# Patient Record
Sex: FEMALE | Race: AMERICAN INDIAN OR ALASKA NATIVE | NOT HISPANIC OR LATINO | Employment: OTHER | URBAN - METROPOLITAN AREA
[De-identification: names, ages, dates, MRNs, and addresses within clinical notes are randomized per-mention and may not be internally consistent; named-entity substitution may affect disease eponyms.]

---

## 2017-03-27 PROBLEM — R10.30 LOWER ABDOMINAL PAIN: Status: ACTIVE | Noted: 2017-03-27

## 2017-05-17 PROBLEM — K21.9 GERD (GASTROESOPHAGEAL REFLUX DISEASE): Status: ACTIVE | Noted: 2017-05-17

## 2017-05-17 PROBLEM — R10.30 LOWER ABDOMINAL PAIN: Status: RESOLVED | Noted: 2017-03-27 | Resolved: 2017-05-17

## 2017-05-17 PROBLEM — Z90.710 S/P TAH (TOTAL ABDOMINAL HYSTERECTOMY): Status: ACTIVE | Noted: 2017-05-17

## 2017-05-17 PROBLEM — E78.5 HLD (HYPERLIPIDEMIA): Status: ACTIVE | Noted: 2017-05-17

## 2017-05-22 ENCOUNTER — PATIENT OUTREACH (OUTPATIENT)
Dept: ADMINISTRATIVE | Facility: CLINIC | Age: 37
End: 2017-05-22

## 2017-05-22 NOTE — PATIENT INSTRUCTIONS
Exploratory Laparotomy     This is an example of the kind of incision the surgeon may use to perform exploratory laparotomy.   Exploratory laparotomy is surgery to open up the belly area (abdomen). This surgery is done to find the cause of problems (such as belly pain or bleeding) that testing could not diagnose. It is also used when an abdominal injury needs emergency medical care. This surgery uses one large cut (incision). The provider can then see and check the organs inside the abdomen. If the cause of the problem is found during the procedure, then treatment is often done at the same time. In some cases, a minimally invasive surgery called exploratory laparoscopy may be used instead. That method uses a tiny camera and several small incisions. But in many cases an exploratory laparotomy is preferred. Read on to learn more about this procedure.  Reasons for the surgery  Organs that may be looked at during exploratory laparotomy include:  · Liver  · Gallbladder  · Spleen  · Pancreas  · Kidneys  · Stomach  · Small intestine (small bowel)  · Large intestine (colon or large bowel)  · Appendix  · Ovaries, fallopian tubes, and uterus (in women)  · Lymph nodes   · Abdominal blood vessels   · Membranes that line the abdominal cavity  Getting ready for the surgery  The surgery takes place in a hospital. It is done by a surgeon. You will likely stay in the hospital for a few days or longer. To prepare for the surgery, do the following:  · Tell your provider about any medicines youre taking. This includes over-the-counter medicines, prescription medicines, herbs, street drugs, herbs, vitamins, and other supplements. You may need to stop taking some or all of them for a time before the surgery.  · Tell your provider if you drink alcohol. This is very important if you are a heavy drinker. Alcohol withdrawal can be life-threatening, so be honest with your provider.  · Also tell your provider if you have any allergies or  other health problems. This includes recent illnesses, especially any bleeding problems.   · You will need to stop eating and drinking for a set amount of time before the surgery. Be sure to follow these instructions carefully.  The day of the surgery  · Most exploratory laparotomies are done as emergency surgery after an injury or accident.   · You will be checked for risks of heart, lung, or other problems during surgery.   · You may need to change into a hospital gown.  · Before the surgery begins, an IV (intravenous) line is put into a vein in your arm or hand. This line supplies fluids and medicines.  · You will be given medicine (general anesthesia) to keep you free of pain. This medicine puts you in a state like deep sleep during the surgery.   · A tube may be placed through your mouth and into your throat to help with breathing. Also, monitors are attached to your body. These record your vital signs, such as heart rate and blood pressure, during the surgery.   · A thin tube (catheter) is placed into your bladder. This tube drains urine from your bladder during the surgery.  During the surgery  · The skin over your belly is cleaned.  · An incision is made in your belly.  · The tissue, blood vessels, and organs in your belly are carefully looked at and checked for problems.  · Tissue samples (biopsy) may be removed and sent to a lab for study.  · If the cause of the problem is found, treatment may be done then, if needed.  · When the surgery is done, the incision is closed with stitches (sutures) or staples. A drain may be placed in the abdomen to remove any extra fluids.  After the surgery  · You will be taken to a room to rest until you have recovered from the anesthesia. Nurses will closely watch your condition. When you are more awake and alert, you will be moved to another room.  · Medicines are given to help prevent infection and to manage pain, if needed.  · You will not be given food or drink until  your bowels start to work normally again. This may take a few days.  · You will need to get up and walk around as soon as you are able. This helps to prevent blood clots.  · Also, you may be given breathing exercises to do. These help prevent pneumonia.  · The tube to drain urine is usually removed within a few days.  · If a drain was used for your incision, this is also removed.  · You will be able to go home when the provider says there are no issues of concern.  · Arrange for an adult family member or friend to drive you home.  · Before leaving, make sure you have all the prescriptions and home care instructions you will need. Also make sure you have a contact number for your provider or the hospital. This is in case you have problems or questions after the surgery.  · Do not lift anything heavier than 5 pounds for about 6 weeks. This gives tissue time to heal, and can prevent a hernia.  When to call your provider  After you get home, call your healthcare provider if you have:  · Fever of 100.4°F (38.0°C) or higher, or as advised by your provider  · Increased pain, redness, swelling, bleeding, or drainage at the incision site  · Pain that cannot be controlled with medicines  · Swollen belly  · Diarrhea or constipation that does not get better within 2 days  · Bloody or black, tarry stools  · Problems or pain with urination  · Chest pain, shortness of breath, or cough that wont go away  · Nausea and vomiting  · Dizziness or fainting  · Swelling or pain in the leg   Follow-up  Recovery time will vary for each person. It may take as long as 4 to 6 weeks. You will need to see your provider for follow-up. This is to remove any sutures or staples and to check your healing progress.  Risks and possible complications  These vary depending on the reason for the surgery. The most common risks and possible complications include:  · Bleeding  · Infection  · Can't find the cause of the problem, so more surgery or other  treatments may be needed  · Incision doesn't heal well  · Damage, injury, or problems with the bowels  · Risks of anesthesia   Date Last Reviewed: 7/1/2016  © 9368-2293 [employer]. [address] [place] [address], [place] [address]. All rights reserved. This information is not intended as a substitute for professional medical care. Always follow your healthcare professional's instructions.

## 2017-10-17 PROBLEM — G25.2 INTENTION TREMOR: Status: ACTIVE | Noted: 2017-10-17

## 2017-10-17 PROBLEM — D50.0 IRON DEFICIENCY ANEMIA DUE TO CHRONIC BLOOD LOSS: Status: ACTIVE | Noted: 2017-10-17

## 2019-03-21 PROBLEM — H26.9 CATARACT: Status: ACTIVE | Noted: 2019-03-21

## 2019-04-02 PROBLEM — H25.811 COMBINED FORM OF AGE-RELATED CATARACT, RIGHT EYE: Status: ACTIVE | Noted: 2019-04-02

## 2019-07-16 ENCOUNTER — PATIENT OUTREACH (OUTPATIENT)
Dept: ADMINISTRATIVE | Facility: HOSPITAL | Age: 39
End: 2019-07-16

## 2020-07-02 ENCOUNTER — TELEPHONE (OUTPATIENT)
Dept: ADMINISTRATIVE | Facility: HOSPITAL | Age: 40
End: 2020-07-02

## 2020-07-02 DIAGNOSIS — R92.8 ABNORMAL FINDING ON BREAST IMAGING: Primary | ICD-10-CM

## 2021-07-01 ENCOUNTER — PATIENT MESSAGE (OUTPATIENT)
Dept: ADMINISTRATIVE | Facility: OTHER | Age: 41
End: 2021-07-01

## 2022-02-01 DIAGNOSIS — R22.2 LOCALIZED SWELLING, MASS AND LUMP, TRUNK: ICD-10-CM

## 2022-02-18 NOTE — PROGRESS NOTES
History & Physical    SUBJECTIVE:     History of Present Illness:  Patient is a 42 y.o. female smoker with obesity, HTN, HLD, and non-occlusive CAD here today for evaluation of an anterior mediastinal mass. Outside chest CT at [facility] in  incidentally noted anterior mediastinal mass. Followed with serial scans which have shown gross stability. No complaints today.     Smoker. ~25 pack years. Denies etoh.    PSH:  section, tubal ligation, hysterectomy.   Disabled    Chief Complaint   Patient presents with    Consult       Review of patient's allergies indicates:   Allergen Reactions    Venom-honey bee Anaphylaxis    Ultram [tramadol] Nausea And Vomiting    Yellow jacket venom     Betadine [povidone-iodine] Itching       Current Outpatient Medications   Medication Sig Dispense Refill    albuterol (PROAIR HFA) 90 mcg/actuation inhaler Inhale 2 puffs into the lungs every 6 (six) hours as needed for Wheezing. Rescue 18 g 3    aspirin 81 MG Chew Take 81 mg by mouth once daily.      atorvastatin (LIPITOR) 40 MG tablet Take 1 tablet (40 mg total) by mouth once daily. 90 tablet 3    budesonide-formoterol 80-4.5 mcg (SYMBICORT) 80-4.5 mcg/actuation HFAA Inhale 2 puffs into the lungs 2 (two) times a day. Controller 10.2 g 11    cyclobenzaprine (FLEXERIL) 10 MG tablet TAKE 1 TABLET BY MOUTH THREE TIMES DAILY IF NEEDED FOR MUSCLE SPASM **TAKE INITIALLY AT BEDTIME TO ESTABLISH TOLERANCE** 90 tablet 1    fish oil-omega-3 fatty acids 300-1,000 mg capsule Take 2 g by mouth once daily.      gabapentin (NEURONTIN) 300 MG capsule TAKE 1 CAPSULE BY MOUTH THREE TIMES DAILY TAKE INITIALLY AT NIGHT TO ESTABLISH TOLERANCE 90 capsule 5    HYDROcodone-acetaminophen (NORCO) 7.5-325 mg per tablet Take 1 tablet by mouth every 12 (twelve) hours as needed for Pain. 60 tablet 0    metoprolol succinate (TOPROL-XL) 25 MG 24 hr tablet Take 1 tablet (25 mg total) by mouth once daily. 90 tablet 3    nicotine (NICODERM CQ) 14 mg/24  hr Place 1 patch onto the skin once daily. 14 patch 1    nicotine (NICODERM CQ) 21 mg/24 hr Place 1 patch onto the skin once daily. 14 patch 1    nicotine (NICODERM CQ) 7 mg/24 hr Place 1 patch onto the skin once daily. 14 patch 1    prochlorperazine (COMPAZINE) 10 MG tablet TAKE 1 TABLET BY MOUTH THREE TIMES DAILY IF NEEDED FOR HEADACHE 60 tablet 0    traZODone (DESYREL) 100 MG tablet Take 1 tablet (100 mg total) by mouth every evening. May take two pills if needed. 60 tablet 6    sumatriptan (IMITREX) 50 MG tablet Take 1 tablet (50 mg total) by mouth once. May take an additional pill 2 hrs later if no relief for 1 dose 30 tablet 2     No current facility-administered medications for this visit.       Past Medical History:   Diagnosis Date    Anxiety     Cataract     Chronic back pain     COPD (chronic obstructive pulmonary disease)     HLD (hyperlipidemia) 2017    Hypertension     Scoliosis     Tobacco abuse      Past Surgical History:   Procedure Laterality Date    ABCESS DRAINAGE      left thigh    BREAST BIOPSY Bilateral     CATARACT EXTRACTION W/  INTRAOCULAR LENS IMPLANT Left 2019    CATARACT EXTRACTION W/  INTRAOCULAR LENS IMPLANT Right 2019    Procedure: EXTRACTION, CATARACT, WITH IOL INSERTION;  Surgeon: Dhruv Dasilva MD;  [facility];  Service: Ophthalmology;  Laterality: Right;     SECTION      x2    HYSTERECTOMY      TUBAL LIGATION       Family History   Problem Relation Age of Onset    Stroke Mother     Diabetes Mother     Hyperlipidemia Mother     Hypertension Mother     Kidney disease Mother     Cancer Mother 40        Colon cancer    No Known Problems Father     Breast cancer Maternal Aunt      Social History     Tobacco Use    Smoking status: Current Some Day Smoker     Packs/day: 1.00     Years: 18.00     Pack years: 18.00     Types: Cigarettes     Start date: 1997    Smokeless tobacco: Never Used   Substance Use Topics     "Alcohol use: No     Alcohol/week: 0.0 standard drinks    Drug use: No        Review of Systems:  Review of Systems   Constitutional: Negative for activity change, appetite change, chills, fatigue, fever and unexpected weight change.   Eyes: Negative for visual disturbance.   Respiratory: Negative for shortness of breath.    Cardiovascular: Negative for chest pain.   Gastrointestinal: Negative for abdominal pain.   Genitourinary: Negative for difficulty urinating.   Musculoskeletal: Negative for arthralgias.   Skin: Negative for color change.   Neurological: Negative for dizziness and syncope.   Psychiatric/Behavioral: Negative for agitation.       OBJECTIVE:     Vital Signs (Most Recent)  Vitals:    02/23/22 1020   BP: 128/84   Pulse: 110   SpO2: 97%   Weight: 111.4 kg (245 lb 9.5 oz)   Height: 5' 4" (1.626 m)   PainSc: 0-No pain        Physical Exam:  Physical Exam  Constitutional:       Appearance: She is obese.   Cardiovascular:      Rate and Rhythm: Normal rate and regular rhythm.      Pulses: Normal pulses.      Heart sounds: Normal heart sounds.   Pulmonary:      Effort: Pulmonary effort is normal.      Breath sounds: Normal breath sounds.   Chest:      Comments: Large pendulous breast   Skin:     General: Skin is warm and dry.   Neurological:      General: No focal deficit present.      Mental Status: She is alert and oriented to person, place, and time.   Psychiatric:         Mood and Affect: Mood normal.         Behavior: Behavior normal.         Chest CT with contrast 8/23/21:  1. A right anterior mediastinal mass, stable in size from prior CT of 12/20/2020.  The mass demonstrates increased attenuation on today's examination, increased from the noncontrast examination of 2020, suggestive of enhancement.  Differential diagnosis includes thymoma.  2. No lymph node enlargement.    PFTS  FEV1 2.16L 72%  DLCO 15.42  65%    Chest CT with contrast 2/11/22:  Persistent anterior mediastinal mass measuring 5.2 cm " in greatest dimension.  This is suspicious for a thymoma.  This is unchanged in size compared to the prior study  2 cm mass in the left breast compatible with a cyst.  This is unchanged from the prior examination.  Diffuse fatty change throughout the liver.    ASSESSMENT/PLAN:     Patient is a 41 y.o. 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 This appears stable for the last 6 months and while I do not have images to review from 2020 there was mention of mass dating back. Recommend consistent efforts and weight loss. We will see her back in 6 months with a chest CT to reassess.

## 2022-02-23 ENCOUNTER — OFFICE VISIT (OUTPATIENT)
Dept: CARDIOTHORACIC SURGERY | Facility: CLINIC | Age: 42
End: 2022-02-23
Payer: MEDICAID

## 2022-02-23 VITALS
DIASTOLIC BLOOD PRESSURE: 84 MMHG | OXYGEN SATURATION: 97 % | BODY MASS INDEX: 41.92 KG/M2 | SYSTOLIC BLOOD PRESSURE: 128 MMHG | HEART RATE: 110 BPM | HEIGHT: 64 IN | WEIGHT: 245.56 LBS

## 2022-02-23 DIAGNOSIS — R06.02 SOB (SHORTNESS OF BREATH): Primary | ICD-10-CM

## 2022-02-23 DIAGNOSIS — J98.59 MEDIASTINAL MASS: ICD-10-CM

## 2022-02-23 PROCEDURE — 3079F DIAST BP 80-89 MM HG: CPT | Mod: CPTII,,, | Performed by: THORACIC SURGERY (CARDIOTHORACIC VASCULAR SURGERY)

## 2022-02-23 PROCEDURE — 99205 OFFICE O/P NEW HI 60 MIN: CPT | Mod: S$PBB,,, | Performed by: THORACIC SURGERY (CARDIOTHORACIC VASCULAR SURGERY)

## 2022-02-23 PROCEDURE — 1159F PR MEDICATION LIST DOCUMENTED IN MEDICAL RECORD: ICD-10-PCS | Mod: CPTII,,, | Performed by: THORACIC SURGERY (CARDIOTHORACIC VASCULAR SURGERY)

## 2022-02-23 PROCEDURE — 3074F SYST BP LT 130 MM HG: CPT | Mod: CPTII,,, | Performed by: THORACIC SURGERY (CARDIOTHORACIC VASCULAR SURGERY)

## 2022-02-23 PROCEDURE — 3008F BODY MASS INDEX DOCD: CPT | Mod: CPTII,,, | Performed by: THORACIC SURGERY (CARDIOTHORACIC VASCULAR SURGERY)

## 2022-02-23 PROCEDURE — 3079F PR MOST RECENT DIASTOLIC BLOOD PRESSURE 80-89 MM HG: ICD-10-PCS | Mod: CPTII,,, | Performed by: THORACIC SURGERY (CARDIOTHORACIC VASCULAR SURGERY)

## 2022-02-23 PROCEDURE — 3008F PR BODY MASS INDEX (BMI) DOCUMENTED: ICD-10-PCS | Mod: CPTII,,, | Performed by: THORACIC SURGERY (CARDIOTHORACIC VASCULAR SURGERY)

## 2022-02-23 PROCEDURE — 99205 PR OFFICE/OUTPT VISIT, NEW, LEVL V, 60-74 MIN: ICD-10-PCS | Mod: S$PBB,,, | Performed by: THORACIC SURGERY (CARDIOTHORACIC VASCULAR SURGERY)

## 2022-02-23 PROCEDURE — 99999 PR PBB SHADOW E&M-EST. PATIENT-LVL IV: CPT | Mod: PBBFAC,,, | Performed by: THORACIC SURGERY (CARDIOTHORACIC VASCULAR SURGERY)

## 2022-02-23 PROCEDURE — 3074F PR MOST RECENT SYSTOLIC BLOOD PRESSURE < 130 MM HG: ICD-10-PCS | Mod: CPTII,,, | Performed by: THORACIC SURGERY (CARDIOTHORACIC VASCULAR SURGERY)

## 2022-02-23 PROCEDURE — 99214 OFFICE O/P EST MOD 30 MIN: CPT | Mod: PBBFAC | Performed by: THORACIC SURGERY (CARDIOTHORACIC VASCULAR SURGERY)

## 2022-02-23 PROCEDURE — 1159F MED LIST DOCD IN RCRD: CPT | Mod: CPTII,,, | Performed by: THORACIC SURGERY (CARDIOTHORACIC VASCULAR SURGERY)

## 2022-02-23 PROCEDURE — 99999 PR PBB SHADOW E&M-EST. PATIENT-LVL IV: ICD-10-PCS | Mod: PBBFAC,,, | Performed by: THORACIC SURGERY (CARDIOTHORACIC VASCULAR SURGERY)

## 2022-02-23 NOTE — LETTER
[facility] - Thoracic Surgery  [address]  [place] [address]  Phone: [phone]  Fax: [phone] February 24, 2022        Rosie Gonzalez, NP  [address] [facility] [address]    Patient: Samy Fish   MR Number: [id number]   YOB: 1980   Date of Visit: 2/23/2022     Dear Ms. Gonzalez:    Thank you for referring Samy Fish to me for evaluation. Ms. Fish presents for evaluation of an incidental anterior mediastinal mass.  The mass is slightly tubular in shape and appears to be well circumscribed. She has no symptoms suggestive of myasthenia gravis.    Physical exam was most notable for obesity including large pendulous breasts.  These physical exam findings could compromise the technical effectiveness of a robotic approach and increase the risk of sternal dehiscence in the event that a median sternotomy is performed.    I recommend repeat chest CT in 6 months.  I strongly encouraged Ms. Fish to engage in a weight reduction program to optimize her chances for success in the event that surgery is performed.    Sincerely,    MD COLLINS Carson/alexandr Levyosure

## 2022-05-02 ENCOUNTER — PATIENT MESSAGE (OUTPATIENT)
Dept: SMOKING CESSATION | Facility: CLINIC | Age: 42
End: 2022-05-02
Payer: MEDICAID

## 2022-11-28 ENCOUNTER — PATIENT MESSAGE (OUTPATIENT)
Dept: CARDIOTHORACIC SURGERY | Facility: CLINIC | Age: 42
End: 2022-11-28
Payer: MEDICAID

## 2022-11-28 ENCOUNTER — TELEPHONE (OUTPATIENT)
Dept: CARDIOTHORACIC SURGERY | Facility: CLINIC | Age: 42
End: 2022-11-28
Payer: MEDICAID

## 2022-11-28 NOTE — TELEPHONE ENCOUNTER
Called patient regarding message to reschedule ct and appt with Dr Saunders. Mailbox is full and unable to leave a message. Message sent via [employer] as well.

## 2023-02-07 DIAGNOSIS — J98.59 MEDIASTINAL MASS: Primary | ICD-10-CM

## 2023-03-06 NOTE — PROGRESS NOTES
Progress Note     SUBJECTIVE:     History of Present Illness:  Patient is a 43 y.o. female smoker with obesity (BMI 42.57), HTN, HLD, and non-occlusive CAD here today for follow up evaluation of an anterior mediastinal mass. Outside chest CT at [facility] in  incidentally noted anterior mediastinal mass. Followed with serial scans which have shown gross stability. At last visit discussed weight loss prior to surgery if mass remains stable. She attempted an OTC diet pill but feels she is still gaining weight.     Quit smoking 2 weeks ago. ~25 pack years. Denies etoh.    PSH:  section, tubal ligation, hysterectomy.   Disabled    Chief Complaint   Patient presents with    Follow-up       Review of patient's allergies indicates:   Allergen Reactions    Venom-honey bee Anaphylaxis    Ultram [tramadol] Nausea And Vomiting    Yellow jacket venom     Betadine [povidone-iodine] Itching       Current Outpatient Medications   Medication Sig Dispense Refill    albuterol (PROAIR HFA) 90 mcg/actuation inhaler Inhale 2 puffs into the lungs every 6 (six) hours as needed for Wheezing. Rescue 18 g 3    albuterol (PROVENTIL/VENTOLIN HFA) 90 mcg/actuation inhaler Inhale 2 puffs into the lungs every 6 (six) hours as needed for Wheezing. Rescue 18 g 6    albuterol (PROVENTIL/VENTOLIN HFA) 90 mcg/actuation inhaler INHALE 2 PUFFS BY MOUTH INTO THE LUNGS EVERY SIX HOURS IF NEEDED FOR WHEEZING . RESCUE 18 g 5    ALPRAZolam (XANAX) 0.5 MG tablet TAKE 1 TABLET BY MOUTH TWICE DAILY IF NEEDED FOR ANXIETY 60 tablet 1    aspirin 81 MG Chew Take 81 mg by mouth once daily.      atorvastatin (LIPITOR) 40 MG tablet Take 1 tablet (40 mg total) by mouth once daily. 90 tablet 3    baclofen (LIORESAL) 10 MG tablet Take 1 tablet (10 mg total) by mouth 3 (three) times daily. 90 tablet 11    budesonide-formoterol 160-4.5 mcg (SYMBICORT) 160-4.5 mcg/actuation HFAA Inhale 2 puffs into the lungs every 12 (twelve) hours. Controller 10.2 g 11     buPROPion (WELLBUTRIN XL) 150 MG TB24 tablet       cetirizine (ZYRTEC) 10 MG tablet Take 1 tablet (10 mg total) by mouth once daily. 30 tablet 11    DULoxetine (CYMBALTA) 30 MG capsule Take 1 capsule (30 mg total) by mouth once daily. 30 capsule 11    famotidine (PEPCID) 20 MG tablet Take 1 tablet (20 mg total) by mouth 2 (two) times daily. 60 tablet 11    fish oil-omega-3 fatty acids 300-1,000 mg capsule Take 2 g by mouth once daily.      fluconazole (DIFLUCAN) 100 MG tablet Take 2 tablets (200 mg total) by mouth once daily. 5 tablet 3    gabapentin (NEURONTIN) 300 MG capsule TAKE 1 CAPSULE BY MOUTH THREE TIMES DAILY TAKE INITIALLY AT NIGHT TO ESTABLISH TOLERANCE 90 capsule 5    metoprolol succinate (TOPROL-XL) 25 MG 24 hr tablet Take 1 tablet (25 mg total) by mouth once daily. 90 tablet 3    metoprolol succinate (TOPROL-XL) 50 MG 24 hr tablet Take 1 tablet (50 mg total) by mouth once daily. 90 tablet 3    nicotine (NICODERM CQ) 14 mg/24 hr Place 1 patch onto the skin once daily. 14 patch 1    nicotine (NICODERM CQ) 14 mg/24 hr Place 1 patch onto the skin once daily. 14 patch 0    nicotine (NICODERM CQ) 21 mg/24 hr Place 1 patch onto the skin once daily. 14 patch 0    nicotine (NICODERM CQ) 7 mg/24 hr Place 1 patch onto the skin once daily. 14 patch 1    prochlorperazine (COMPAZINE) 10 MG tablet TAKE 1 TABLET BY MOUTH THREE TIMES DAILY IF NEEDED FOR MIGRAINE 90 tablet 0    tiZANidine (ZANAFLEX) 4 MG tablet       traZODone (DESYREL) 300 MG tablet Take 1 tablet (300 mg total) by mouth every evening. 90 tablet 3    nicotine (NICODERM CQ) 7 mg/24 hr Place 1 patch onto the skin once daily. 14 patch 0     No current facility-administered medications for this visit.       Past Medical History:   Diagnosis Date    Anxiety     Cataract     Chronic back pain     COPD (chronic obstructive pulmonary disease)     HLD (hyperlipidemia) 5/17/2017    Hypertension     Scoliosis     Tobacco abuse      Past  "Surgical History:   Procedure Laterality Date    ABCESS DRAINAGE      left thigh    BREAST BIOPSY Bilateral     CATARACT EXTRACTION W/  INTRAOCULAR LENS IMPLANT Left 2019    CATARACT EXTRACTION W/  INTRAOCULAR LENS IMPLANT Right 2019    Procedure: EXTRACTION, CATARACT, WITH IOL INSERTION;  Surgeon: Dhruv Dasilva MD;  Location[facility];  Service: Ophthalmology;  Laterality: Right;     SECTION      x2    HYSTERECTOMY      TUBAL LIGATION       Family History   Problem Relation Age of Onset    Stroke Mother     Diabetes Mother     Hyperlipidemia Mother     Hypertension Mother     Kidney disease Mother     Cancer Mother 40        Colon cancer    No Known Problems Father     Breast cancer Maternal Aunt      Social History     Tobacco Use    Smoking status: Every Day     Packs/day: 1.00     Years: 18.00     Pack years: 18.00     Types: Cigarettes     Start date: 1997    Smokeless tobacco: Never   Substance Use Topics    Alcohol use: No     Alcohol/week: 0.0 standard drinks    Drug use: No        Review of Systems:  Review of Systems   Constitutional:  Negative for activity change, appetite change, chills, fatigue, fever and unexpected weight change.   Eyes:  Negative for visual disturbance.   Respiratory:  Negative for shortness of breath.    Cardiovascular:  Negative for chest pain.   Gastrointestinal:  Negative for abdominal pain.   Genitourinary:  Negative for difficulty urinating.   Musculoskeletal:  Negative for arthralgias.   Skin:  Negative for color change.   Neurological:  Negative for dizziness and syncope.   Psychiatric/Behavioral:  Negative for agitation.      OBJECTIVE:     Vital Signs (Most Recent)  Vitals:    23 0900   BP: 124/87   Pulse: 95   SpO2: 96%   Weight: 114.6 kg (252 lb 10.4 oz)   Height: 5' 4" (1.626 m)   PainSc: 0-No pain          Physical Exam:  Physical Exam  Constitutional:       Appearance: She is obese.   Cardiovascular:      Rate and " Rhythm: Normal rate and regular rhythm.      Pulses: Normal pulses.      Heart sounds: Normal heart sounds.   Pulmonary:      Effort: Pulmonary effort is normal.      Breath sounds: Normal breath sounds.   Chest:      Comments: Large pendulous breast   Abdominal:      Comments: Obese abdomen   Skin:     General: Skin is warm and dry.      Capillary Refill: Capillary refill takes less than 2 seconds.      Comments: Very thick fatty layer along chest wall.  Unable to palpate ribcage due to fatty tissue   Neurological:      General: No focal deficit present.      Mental Status: She is alert and oriented to person, place, and time.   Psychiatric:      Comments: Very blunted affect       Chest CT with contrast 8/23/21:  1. A right anterior mediastinal mass, stable in size from prior CT of 12/20/2020.  The mass demonstrates increased attenuation on today's examination, increased from the noncontrast examination of 2020, suggestive of enhancement.  Differential diagnosis includes thymoma.  2. No lymph node enlargement.    PFTS  FEV1 2.16L 72%  DLCO 15.42  65%    Chest CT with contrast 2/11/22:  Persistent anterior mediastinal mass measuring 5.2 cm in greatest dimension.  This is suspicious for a thymoma.  This is unchanged in size compared to the prior study  2 cm mass in the left breast compatible with a cyst.  This is unchanged from the prior examination.  Diffuse fatty change throughout the liver.    Chest CT 03/08/23:   Grossly stable anterior mediastinal soft tissue     ASSESSMENT/PLAN:     Patient is a 41 y.o. 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   Morbid obesity     PLAN:Plan     Will place referral to bariatric medicine for review. Body habitus remains a concern for proper port placement with minimally invasive resection and wound healing/tension following median sternotomy. Recommend consistent efforts and weight  loss. We will see her back in 9 months with a chest CT to reassess.

## 2023-03-08 ENCOUNTER — HOSPITAL ENCOUNTER (OUTPATIENT)
Dept: RADIOLOGY | Facility: HOSPITAL | Age: 43
Discharge: HOME | End: 2023-03-08
Attending: PHYSICIAN ASSISTANT
Payer: MEDICAID

## 2023-03-08 ENCOUNTER — OFFICE VISIT (OUTPATIENT)
Dept: CARDIOTHORACIC SURGERY | Facility: CLINIC | Age: 43
End: 2023-03-08
Payer: MEDICAID

## 2023-03-08 VITALS
SYSTOLIC BLOOD PRESSURE: 124 MMHG | WEIGHT: 252.63 LBS | OXYGEN SATURATION: 96 % | HEART RATE: 95 BPM | HEIGHT: 64 IN | BODY MASS INDEX: 43.13 KG/M2 | DIASTOLIC BLOOD PRESSURE: 87 MMHG

## 2023-03-08 DIAGNOSIS — E66.01 MORBID OBESITY: ICD-10-CM

## 2023-03-08 DIAGNOSIS — J98.59 MEDIASTINAL MASS: Primary | ICD-10-CM

## 2023-03-08 DIAGNOSIS — J98.59 MEDIASTINAL MASS: ICD-10-CM

## 2023-03-08 PROCEDURE — 3044F HG A1C LEVEL LT 7.0%: CPT | Mod: CPTII,,, | Performed by: THORACIC SURGERY (CARDIOTHORACIC VASCULAR SURGERY)

## 2023-03-08 PROCEDURE — 99215 OFFICE O/P EST HI 40 MIN: CPT | Mod: PBBFAC,25 | Performed by: THORACIC SURGERY (CARDIOTHORACIC VASCULAR SURGERY)

## 2023-03-08 PROCEDURE — 71250 CT THORAX DX C-: CPT | Mod: TC

## 2023-03-08 PROCEDURE — 99999 PR PBB SHADOW E&M-EST. PATIENT-LVL V: ICD-10-PCS | Mod: PBBFAC,,, | Performed by: THORACIC SURGERY (CARDIOTHORACIC VASCULAR SURGERY)

## 2023-03-08 PROCEDURE — 3044F PR MOST RECENT HEMOGLOBIN A1C LEVEL <7.0%: ICD-10-PCS | Mod: CPTII,,, | Performed by: THORACIC SURGERY (CARDIOTHORACIC VASCULAR SURGERY)

## 2023-03-08 PROCEDURE — 99999 PR PBB SHADOW E&M-EST. PATIENT-LVL V: CPT | Mod: PBBFAC,,, | Performed by: THORACIC SURGERY (CARDIOTHORACIC VASCULAR SURGERY)

## 2023-03-08 PROCEDURE — 99214 PR OFFICE/OUTPT VISIT, EST, LEVL IV, 30-39 MIN: ICD-10-PCS | Mod: S$PBB,,, | Performed by: THORACIC SURGERY (CARDIOTHORACIC VASCULAR SURGERY)

## 2023-03-08 PROCEDURE — 99214 OFFICE O/P EST MOD 30 MIN: CPT | Mod: S$PBB,,, | Performed by: THORACIC SURGERY (CARDIOTHORACIC VASCULAR SURGERY)

## 2023-03-08 PROCEDURE — 3008F PR BODY MASS INDEX (BMI) DOCUMENTED: ICD-10-PCS | Mod: CPTII,,, | Performed by: THORACIC SURGERY (CARDIOTHORACIC VASCULAR SURGERY)

## 2023-03-08 PROCEDURE — 71250 CT CHEST WITHOUT CONTRAST: ICD-10-PCS | Mod: 26,,, | Performed by: RADIOLOGY

## 2023-03-08 PROCEDURE — 3079F PR MOST RECENT DIASTOLIC BLOOD PRESSURE 80-89 MM HG: ICD-10-PCS | Mod: CPTII,,, | Performed by: THORACIC SURGERY (CARDIOTHORACIC VASCULAR SURGERY)

## 2023-03-08 PROCEDURE — 1159F MED LIST DOCD IN RCRD: CPT | Mod: CPTII,,, | Performed by: THORACIC SURGERY (CARDIOTHORACIC VASCULAR SURGERY)

## 2023-03-08 PROCEDURE — 3008F BODY MASS INDEX DOCD: CPT | Mod: CPTII,,, | Performed by: THORACIC SURGERY (CARDIOTHORACIC VASCULAR SURGERY)

## 2023-03-08 PROCEDURE — 3079F DIAST BP 80-89 MM HG: CPT | Mod: CPTII,,, | Performed by: THORACIC SURGERY (CARDIOTHORACIC VASCULAR SURGERY)

## 2023-03-08 PROCEDURE — 1159F PR MEDICATION LIST DOCUMENTED IN MEDICAL RECORD: ICD-10-PCS | Mod: CPTII,,, | Performed by: THORACIC SURGERY (CARDIOTHORACIC VASCULAR SURGERY)

## 2023-03-08 PROCEDURE — 3074F PR MOST RECENT SYSTOLIC BLOOD PRESSURE < 130 MM HG: ICD-10-PCS | Mod: CPTII,,, | Performed by: THORACIC SURGERY (CARDIOTHORACIC VASCULAR SURGERY)

## 2023-03-08 PROCEDURE — 3074F SYST BP LT 130 MM HG: CPT | Mod: CPTII,,, | Performed by: THORACIC SURGERY (CARDIOTHORACIC VASCULAR SURGERY)

## 2023-03-08 PROCEDURE — 71250 CT THORAX DX C-: CPT | Mod: 26,,, | Performed by: RADIOLOGY

## 2023-05-24 ENCOUNTER — PATIENT OUTREACH (OUTPATIENT)
Dept: ADMINISTRATIVE | Facility: HOSPITAL | Age: 43
End: 2023-05-24
Payer: MEDICAID

## 2023-05-24 DIAGNOSIS — Z12.11 ENCOUNTER FOR SCREENING FOR COLORECTAL MALIGNANT NEOPLASM: Primary | ICD-10-CM

## 2023-05-24 DIAGNOSIS — Z12.12 ENCOUNTER FOR SCREENING FOR COLORECTAL MALIGNANT NEOPLASM: Primary | ICD-10-CM

## 2023-06-05 LAB — NONINV COLON CA DNA+OCC BLD SCRN STL QL: NEGATIVE

## 2023-07-12 ENCOUNTER — TELEPHONE (OUTPATIENT)
Dept: SURGERY | Facility: CLINIC | Age: 43
End: 2023-07-12
Payer: MEDICAID

## 2023-07-20 ENCOUNTER — PATIENT OUTREACH (OUTPATIENT)
Dept: ADMINISTRATIVE | Facility: HOSPITAL | Age: 43
End: 2023-07-20
Payer: MEDICAID

## 2023-09-13 PROBLEM — M54.2 CERVICALGIA: Status: ACTIVE | Noted: 2023-09-13

## 2023-12-04 ENCOUNTER — PATIENT OUTREACH (OUTPATIENT)
Dept: ADMINISTRATIVE | Facility: HOSPITAL | Age: 43
End: 2023-12-04
Payer: MEDICAID

## 2023-12-04 DIAGNOSIS — Z12.31 ENCOUNTER FOR SCREENING MAMMOGRAM FOR BREAST CANCER: Primary | ICD-10-CM

## 2023-12-05 ENCOUNTER — TELEPHONE (OUTPATIENT)
Dept: NUTRITION | Facility: CLINIC | Age: 43
End: 2023-12-05
Payer: MEDICAID

## 2023-12-08 DIAGNOSIS — J98.59 MEDIASTINAL MASS: Primary | ICD-10-CM

## 2024-02-20 ENCOUNTER — TELEPHONE (OUTPATIENT)
Dept: NUTRITION | Facility: CLINIC | Age: 44
End: 2024-02-20
Payer: MEDICAID

## 2024-02-20 NOTE — TELEPHONE ENCOUNTER
Requested pt call RD at [phone] to specify for when she would like her follow up visit to be scheduled.

## 2025-01-02 DIAGNOSIS — Z12.31 OTHER SCREENING MAMMOGRAM: ICD-10-CM
